# Patient Record
Sex: MALE | Race: WHITE | NOT HISPANIC OR LATINO | URBAN - METROPOLITAN AREA
[De-identification: names, ages, dates, MRNs, and addresses within clinical notes are randomized per-mention and may not be internally consistent; named-entity substitution may affect disease eponyms.]

---

## 2025-05-21 ENCOUNTER — TELEPHONE (OUTPATIENT)
Dept: NEUROSURGERY | Facility: CLINIC | Age: 45
End: 2025-05-21

## 2025-05-21 NOTE — TELEPHONE ENCOUNTER
Provider:  Odin  Surgery/Procedure:  rj  Surgery/Procedure Date:    Last visit:   NA- new patient  Next visit:      Reason for call:  Patient is unable to make appointment today and has called to reschedule, please reach out to him to reschedule the appointment.

## 2025-07-01 ENCOUNTER — HOSPITAL ENCOUNTER (OUTPATIENT)
Dept: GENERAL RADIOLOGY | Facility: HOSPITAL | Age: 45
Discharge: HOME | End: 2025-07-01
Admitting: PHYSICIAN ASSISTANT
Payer: MEDICAID

## 2025-07-01 ENCOUNTER — OFFICE VISIT (OUTPATIENT)
Dept: NEUROSURGERY | Facility: CLINIC | Age: 45
End: 2025-07-01
Payer: MEDICAID

## 2025-07-01 VITALS
TEMPERATURE: 97.7 F | HEIGHT: 76 IN | BODY MASS INDEX: 30.81 KG/M2 | WEIGHT: 253 LBS | SYSTOLIC BLOOD PRESSURE: 146 MMHG | DIASTOLIC BLOOD PRESSURE: 88 MMHG

## 2025-07-01 DIAGNOSIS — M47.26 OTHER SPONDYLOSIS WITH RADICULOPATHY, LUMBAR REGION: ICD-10-CM

## 2025-07-01 DIAGNOSIS — G89.29 CHRONIC MIDLINE LOW BACK PAIN WITH RIGHT-SIDED SCIATICA: Primary | ICD-10-CM

## 2025-07-01 DIAGNOSIS — M54.2 NECK PAIN: ICD-10-CM

## 2025-07-01 DIAGNOSIS — M79.601 RIGHT ARM PAIN: ICD-10-CM

## 2025-07-01 DIAGNOSIS — M54.41 CHRONIC MIDLINE LOW BACK PAIN WITH RIGHT-SIDED SCIATICA: Primary | ICD-10-CM

## 2025-07-01 PROCEDURE — 72120 X-RAY BEND ONLY L-S SPINE: CPT

## 2025-07-01 PROCEDURE — 99204 OFFICE O/P NEW MOD 45 MIN: CPT | Performed by: PHYSICIAN ASSISTANT

## 2025-07-01 RX ORDER — ERGOCALCIFEROL 1.25 MG/1
1.25 CAPSULE, LIQUID FILLED ORAL
COMMUNITY
Start: 2025-06-04

## 2025-07-01 RX ORDER — METHOCARBAMOL 750 MG/1
750 TABLET, FILM COATED ORAL 3 TIMES DAILY
COMMUNITY
Start: 2025-06-04

## 2025-07-01 RX ORDER — ATORVASTATIN CALCIUM 40 MG/1
40 TABLET, FILM COATED ORAL DAILY
COMMUNITY
Start: 2025-06-04

## 2025-07-01 RX ORDER — FEBUXOSTAT 80 MG/1
80 TABLET, FILM COATED ORAL DAILY
COMMUNITY
Start: 2025-06-04

## 2025-07-01 RX ORDER — HYDROXYCHLOROQUINE SULFATE 200 MG/1
200 TABLET, FILM COATED ORAL 2 TIMES DAILY
COMMUNITY

## 2025-07-01 RX ORDER — FAMOTIDINE 20 MG/1
TABLET, FILM COATED ORAL
COMMUNITY
Start: 2025-06-19

## 2025-07-01 RX ORDER — HYDROCODONE BITARTRATE AND ACETAMINOPHEN 10; 325 MG/1; MG/1
1 TABLET ORAL
COMMUNITY
Start: 2025-06-04 | End: 2025-07-02

## 2025-07-01 RX ORDER — METHOCARBAMOL 750 MG/1
TABLET, FILM COATED ORAL DAILY
COMMUNITY

## 2025-07-01 RX ORDER — TRIAMCINOLONE ACETONIDE 1 MG/G
OINTMENT TOPICAL
COMMUNITY

## 2025-07-01 RX ORDER — AMITRIPTYLINE HYDROCHLORIDE 10 MG/1
10 TABLET ORAL DAILY
COMMUNITY
Start: 2025-05-19 | End: 2026-05-19

## 2025-07-01 RX ORDER — HYDROXYZINE PAMOATE 25 MG/1
CAPSULE ORAL
COMMUNITY

## 2025-07-01 RX ORDER — LOSARTAN POTASSIUM 100 MG/1
100 TABLET ORAL DAILY
COMMUNITY
Start: 2025-06-04

## 2025-07-01 RX ORDER — HYDROCHLOROTHIAZIDE 12.5 MG/1
12.5 CAPSULE ORAL DAILY
COMMUNITY
Start: 2025-06-04

## 2025-07-01 RX ORDER — GABAPENTIN 600 MG/1
TABLET ORAL
COMMUNITY

## 2025-07-01 RX ORDER — LAMOTRIGINE 100 MG/1
100 TABLET ORAL DAILY
COMMUNITY

## 2025-07-01 RX ORDER — FLUOXETINE HYDROCHLORIDE 40 MG/1
40 CAPSULE ORAL DAILY
COMMUNITY

## 2025-07-01 RX ORDER — COLCHICINE 0.6 MG/1
TABLET ORAL DAILY
COMMUNITY

## 2025-07-01 RX ORDER — METOPROLOL TARTRATE 25 MG/1
12.5 TABLET, FILM COATED ORAL DAILY
COMMUNITY
Start: 2025-06-04

## 2025-07-01 RX ORDER — METHYLPREDNISOLONE 4 MG/1
TABLET ORAL
COMMUNITY
Start: 2025-04-05

## 2025-07-01 NOTE — PROGRESS NOTES
Subjective     Chief Complaint: back pain and right leg pain                               New mid thoracic pain with radiation to right anterior chest                                Right arm pain      Patient ID: Ricci Mast is a 44 y.o. 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 .  He is here today primarily for a 4-year history of back pain that has began to radiate into his left lateral leg.  The pain typically stops at the knee.  It is present throughout most of the day.  It is somewhat better with sitting down worse with activities.  He denies any weakness or falls.  He has had physical therapy in the past  and has seen pain management.  He takes hydrocodone twice daily and gabapentin.  He states that he has also had injections but with little relief of his discomfort.  He has some intermittent right arm pain but no real neck pain.  Of concern to him is a thoracic pain that starts in his mid back and radiates around into his rib cage just below breast level on the right side only.  This discomfort is relatively new, over the last 8-9 months. It is only present when he is standing and is better when he is sitting   He denies weakness or falls.  He has not had any saddle anesthesia or bowel/bladder dysfunction.      The following portions of the patient's history were reviewed and updated as appropriate: allergies, current medications, past family history, past medical history, past social history, past surgical history and problem list.    No past medical history on file.     No past surgical history on file.     Family history: No family history on file.    Social history:   Social History     Socioeconomic History    Marital status:        Review of Systems   Musculoskeletal:  Positive for  "arthralgias, back pain and neck pain.       Objective   Blood pressure 146/88, temperature 97.7 °F (36.5 °C), height 193 cm (76\"), weight 115 kg (253 lb).  Body mass index is 30.8 kg/m².    Physical Exam  CONSTITUTIONAL:   - Patient is well-nourished  - Pleasant and appears stated age.  PSYCHIATRIC:  - Normal mood and affect  - Behavior is normal.  HENT:   Head: Normocephalic and Atraumatic.   Eyes:     - Pupils are equal, round, and reactive to light.     - EOM are normal.   CV:   - Regular rate and rhythm on palpable radial pulse   PULMONARY:   - Speaking in full sentences, breathing non labored  - No wheezing   SKIN:  - Clean, dry and intact   MUSCULOSKELETAL:  - Neck/Back tenderness to palpation is not observed.   - Strength is intact in the upper and lower extremities to direct testing.  - Muscle tone is normal throughout.  - Station and gait are somewhat antalgic   - ROM in neck/back is normal.  - Straight leg raise is positive on the right   -patient indicates right sides back and rib pain at about the T5 or T6 dermatome level.   NEUROLOGICAL:  - A&Ox3  - Attention span, language function, and cognition are intact.  - Sensation is intact to light touch testing throughout.  - Deep tendon reflexes are 1+ in bilateral upper extremities and 0 in the bilateral lower extremities.    - Ruiz's Sign is negative bilaterally.  - No clonus is elicited.  - Coordination is intact.     Patient's Body mass index is 30.80 kg/m². indicating that he meets the BMI criteria for obesity     Assessment & Plan     Independent Review of Radiographic Studies:    Patient has imaging with him today only of his cervical spine.  MRI dated 5/1/2025 shows some moderate degenerative changes.  There is a sizable disc bulge at C5-6 that lateralizes somewhat rightward.  There is no central canal stenosis at this level.  There is a small central disc bulge also at C7-T1 without significant central or foraminal stenosis.  MRI of the lumbar " spine dated 4/22/2025 was also reviewed.  This shows degenerative changes with spondylosis at L2-3 and at L5-S1.  There is a moderate disc bulge at L5-S1 as well that lateralizes rightward, preventing clinical correlation.    Medical Decision Making:    Mr Mast has some degenerative changes in his cervical spine with a sizable rightward disc bulge.  He does not endorse much in the way of neck or arm pain currently.  More bothersome to him is his back and right leg pain.  He does have a spondylosis at L5-S1 with a sizable disc bulge that lateralizes rightward.  He has not had recent physical therapy.  I will order this and also flexion-extension x-rays and I will plan to see him back in the office in 4 to 6 weeks to reevaluate  I did send him for flexion-extension x-rays today.  I do not appreciate any instability L2-3 or L5-S1 level.  Radiology read is not complete  I spent 30+ minutes in obtaining history, reviewing the patient's films with him and discussing the findings as well as formulating a treatment plan    Diagnoses and all orders for this visit:    1. Chronic midline low back pain with right-sided sciatica (Primary)  -     Ambulatory Referral to Physical Therapy for Evaluation & Treatment    2. Other spondylosis with radiculopathy, lumbar region  -     XR Spine Lumbar Flex & Ext; Future    3. Right arm pain  -     Ambulatory Referral to Physical Therapy for Evaluation & Treatment    4. Neck pain  -     Ambulatory Referral to Physical Therapy for Evaluation & Treatment    Other orders  -     MRI outside films; Future  -     MRI outside films; Future        Return in about 5 weeks (around 8/5/2025).       This document signed by Jaqui Dietrich PA-C  July 3, 2025 08:46 EDT